# Patient Record
Sex: FEMALE | Race: WHITE
[De-identification: names, ages, dates, MRNs, and addresses within clinical notes are randomized per-mention and may not be internally consistent; named-entity substitution may affect disease eponyms.]

---

## 2019-10-04 ENCOUNTER — HOSPITAL ENCOUNTER (INPATIENT)
Dept: HOSPITAL 25 - MCHOBOUT | Age: 39
LOS: 4 days | Discharge: HOME | End: 2019-10-08
Attending: OBSTETRICS & GYNECOLOGY | Admitting: OBSTETRICS & GYNECOLOGY
Payer: COMMERCIAL

## 2019-10-04 DIAGNOSIS — D64.9: ICD-10-CM

## 2019-10-04 DIAGNOSIS — O34.10: ICD-10-CM

## 2019-10-04 DIAGNOSIS — Z3A.39: ICD-10-CM

## 2019-10-04 DIAGNOSIS — F41.8: ICD-10-CM

## 2019-10-04 LAB
HCT VFR BLD AUTO: 31 % (ref 35–47)
HGB BLD-MCNC: 10.5 G/DL (ref 12–16)
MCH RBC QN AUTO: 29 PG (ref 27–31)
MCHC RBC AUTO-ENTMCNC: 34 G/DL (ref 31–36)
MCV RBC AUTO: 85 FL (ref 80–97)
PLATELET # BLD AUTO: 179 10^3/UL (ref 150–450)
RBC # BLD AUTO: 3.63 10^6 /UL (ref 3.7–4.87)
WBC # BLD AUTO: 8.7 10^3/UL (ref 3.5–10.8)

## 2019-10-04 PROCEDURE — S0191 MISOPROSTOL, ORAL, 200 MCG: HCPCS

## 2019-10-04 PROCEDURE — 86901 BLOOD TYPING SEROLOGIC RH(D): CPT

## 2019-10-04 PROCEDURE — 84112 EVAL AMNIOTIC FLUID PROTEIN: CPT

## 2019-10-04 PROCEDURE — 86900 BLOOD TYPING SEROLOGIC ABO: CPT

## 2019-10-04 PROCEDURE — 80307 DRUG TEST PRSMV CHEM ANLYZR: CPT

## 2019-10-04 PROCEDURE — 85025 COMPLETE CBC W/AUTO DIFF WBC: CPT

## 2019-10-04 PROCEDURE — 85027 COMPLETE CBC AUTOMATED: CPT

## 2019-10-04 PROCEDURE — 86850 RBC ANTIBODY SCREEN: CPT

## 2019-10-04 PROCEDURE — 36415 COLL VENOUS BLD VENIPUNCTURE: CPT

## 2019-10-04 NOTE — HP
General Information





- Reason for Visit


Pt presents with SROM at about , noted to have some yellow/green color.


Having some mild irreg ctx since leaking started.  No strong ctx.


Pregnancy only complicated by AMA and a large uterine fibroid (7-8cm, but not 

obstructing pelvis).





- General Information


Maternal Age: 39


Grav: 1


Para: 0


SAB: 0


IEA: 0





Estimated Due Date: 10/10/19


Determined By: LMP


Gestational Age in Weeks/Days: 39+1


Maternal Blood Type and Rh: O Positive





- Results this Pregnancy


Serology/RPR Result: Non-Reactive


Rubella Result: Immune


HBsAg Result: Negative


HIV Result: Negative


GBS Culture Result: Positive





Past Medical History


Delivery History: See  Records - no deliveries


Pertinent Past Medical History: See  Records - anxiety, migraines, 

asthma


Pertinent Past Surgical History: See  Records - none





- Antepartal Records


Antepartal Records: Reviewed, Pregnancy Complicated by: - above





Review of Systems


Constitutional: Comfortable


CV Complaint: No


Respiratory: Shortness of Breath: No


Gastrointestinal: No Nausea/Vomiting, Normal Bowel Movement


Genitourinary: Leaking Fluid, No Dysuria, No Bleeding


Musculoskeletal: Contractions - very mild


Neurological: No Headache


Fetal Movement: Normal





Exam


Allergies/Adverse Reactions: 


Allergies





No Known Allergies Allergy (Verified 16 09:54)


 








normal, afebrile


Lab Values - Entire Visit: 


 Laboratory Tests











  10/04/19 10/04/19 10/04/19





  20:30 20:30 20:30


 


WBC  8.7  


 


RBC  3.63 L  


 


Hgb  10.5 L  


 


Hct  31 L  


 


MCV  85  


 


MCH  29  


 


MCHC  34  


 


RDW  14  


 


Plt Count  179  


 


MPV  10.5 H  


 


Vag Amniotic Fld Detect    Positive


 


Urine Opiates Screen   None detected 


 


Ur Barbiturates Screen   None detected 


 


Ur Phencyclidine Scrn   None detected 


 


Ur Amphetamines Screen   None detected 


 


U Benzodiazepines Scrn   None detected 


 


Urine Cocaine Screen   None detected 


 


U Cannabinoids Screen   None detected 


 


Blood Type   


 


Antibody Screen   














  10/04/19





  20:30


 


WBC 


 


RBC 


 


Hgb 


 


Hct 


 


MCV 


 


MCH 


 


MCHC 


 


RDW 


 


Plt Count 


 


MPV 


 


Vag Amniotic Fld Detect 


 


Urine Opiates Screen 


 


Ur Barbiturates Screen 


 


Ur Phencyclidine Scrn 


 


Ur Amphetamines Screen 


 


U Benzodiazepines Scrn 


 


Urine Cocaine Screen 


 


U Cannabinoids Screen 


 


Blood Type  A Positive


 


Antibody Screen  Negative














- Measurements


Height: 5 ft 4 in


Weight: 168 lb


Body Mass Index (BMI): 28.8


Pre-Pregnancy Weight: 145 lb





- Exam


Breast: Breast Exam Deferred


CVA: No CVA Tenderness


Heart: Normal Rhythm/Heart Sounds


HEENT: No Significant Findings


Lungs: Clear Bilaterally


Rectal: Rectal Exam Deferred





- Abdominal Exam


Abdomen Exam: Non-Tender, Fundal Height Consistent with Dates





- Ultrasound/Biophysical Profile


Ultrasound Status: Bedside Exam - vertex presentation





Targeted Exam Findings


Estimated Fetal Weight: 7.5 lbs


Cervical Exam: Fingertip


Effacement: Thin


Station: -2


Presenting Part: Vertex


Membrane Status: SROM


Amniotic Fluid Evaluation: Gross Rupture, Meconium





EFM Findings





- External Fetal Monitor Findings


Baseline Fetal Heart Rate: 140


External Fetal Monitor Findings: Accelerations Present, No Pattern of Variable 

or Late Decelerations, Variability Moderate, Baseline Stable


Contractions: Irregular, Mild





Assessment/Plan





- Assessment


40yo G1 at 39 wks with gross SROM, some meconium noted.


Not in labor at this point.  Discussed need to start abx for GBS.


Also discussed mgmt options.  Pt has discussed option of elective C/S a few 

times during her pregnancy.  We reviewed again today since she had more 

questions.  Pt aware there is no medical indication for a C/S at this point 

since baby looks very good and is in cephalic presentation.  However, if she 

desires a  section, she can ask for one.  She understands there are 

risks of surgery as well, and she declines at this point.


Pt desires to proceed with cervical ripening.


Will start with oral misoprostol now.


Plan to observe closely.  Pt desires Nubain/phenergan for sedation if needed.





- Obstetrical Risk Factors


Obstetrical Risk Factors: GBS Positive





- Plan


Plan: Cervical Ripening, Antibiotic Prophylaxis, Admit - Anticipate Vaginal 

Delivery





- Date/Time of Admission


Date of Admission: 10/04/19


Time of Admission: 20:30

## 2019-10-05 LAB
BASOPHILS # BLD AUTO: 0.1 10^3/UL (ref 0–0.2)
EOSINOPHIL # BLD AUTO: 0.1 10^3/UL (ref 0–0.6)
LYMPHOCYTES # BLD AUTO: 1.7 10^3/UL (ref 1–4.8)
MONOCYTES # BLD AUTO: 0.6 10^3/UL (ref 0–0.8)
NEUTROPHILS # BLD AUTO: 6.5 10^3/UL (ref 1.5–7.7)
NRBC # BLD AUTO: 0 10^3/UL
NRBC BLD QL AUTO: 0

## 2019-10-05 PROCEDURE — 0KQM0ZZ REPAIR PERINEUM MUSCLE, OPEN APPROACH: ICD-10-PCS | Performed by: OBSTETRICS & GYNECOLOGY

## 2019-10-05 RX ADMIN — PENICILLIN G POTASSIUM SCH MLS/HR: 20000000 POWDER, FOR SOLUTION INTRAVENOUS at 09:45

## 2019-10-05 RX ADMIN — PENICILLIN G POTASSIUM SCH MLS/HR: 20000000 POWDER, FOR SOLUTION INTRAVENOUS at 05:30

## 2019-10-05 RX ADMIN — ACETAMINOPHEN PRN MG: 325 TABLET ORAL at 22:07

## 2019-10-05 RX ADMIN — PENICILLIN G POTASSIUM SCH MLS/HR: 20000000 POWDER, FOR SOLUTION INTRAVENOUS at 17:50

## 2019-10-05 RX ADMIN — PENICILLIN G POTASSIUM SCH MLS/HR: 20000000 POWDER, FOR SOLUTION INTRAVENOUS at 01:29

## 2019-10-05 RX ADMIN — IBUPROFEN PRN MG: 600 TABLET, FILM COATED ORAL at 22:07

## 2019-10-05 RX ADMIN — PENICILLIN G POTASSIUM SCH MLS/HR: 20000000 POWDER, FOR SOLUTION INTRAVENOUS at 13:31

## 2019-10-05 NOTE — PROCNOTE
[facility] OB: Delivery Note





- Delivery


  ** A


Date of Birth: [birth year]


Time of Birth: 19:34


Gestational Age in Weeks and Days at Delivery: 39 Weeks and 2 Days


Delivery Method: Spontaneous Vaginal


Labor: Spontaneous


Did Patient attempt ?: N/A, No Previous 


Amniotic Fluid: Clear


Estimated Blood Loss: 300


Anesthesia/Analgesia: CEI for Labor


Delivered By: Janet Farr





- Nursery


Level of Nursery: Regular/Bedside





- Perineum


Perineal Injury: 2nd Degree





- Events


Delivery Events of Note: Pitocin Only After Delivery, Full Course of Antibiotics

, Pushed > 3 Hours





- Additional Delivery Notes


Additional Delivery Notes: 





Pt went into early labor after ROM, received an epidural @2cm and progressed to 

active labor and to fully dilated.  She then pushed x4.5hrs to deliver the 

infant's head in YELITZA position followed quickly by the shoulders and the rest of 

the body. The baby was placed on mom's abdomen and the cord clamped x2 and cut.

  Then baby was  taken to the warmer.  The placenta delivered with fundal 

massage and gentle cord traction and appeared intact.  A second degree 

laceration was repaired in the usual fashion. Fundus was firm with good 

hemostasis.  Mom and baby stable at time of note. Problem: Patient Care Overview  Goal: Plan of Care Review  Outcome: Ongoing (interventions implemented as appropriate)  Patient free of falls or injury during shift.  Positions self and ambulates independently.  Pain mildly controlled with prn medication.  Normal sinus rhythm on telemetry.  Safety maintained.  Bed low and locked, side rails x 2, call bell in reach.  Patient resting comfortably in bed; no other complaints at this time.  Will continue to monitor.

## 2019-10-06 LAB
BASOPHILS # BLD AUTO: 0.1 10^3/UL (ref 0–0.2)
EOSINOPHIL # BLD AUTO: 0.1 10^3/UL (ref 0–0.6)
HCT VFR BLD AUTO: 26 % (ref 35–47)
HGB BLD-MCNC: 8.7 G/DL (ref 12–16)
LYMPHOCYTES # BLD AUTO: 1.7 10^3/UL (ref 1–4.8)
MCH RBC QN AUTO: 29 PG (ref 27–31)
MCHC RBC AUTO-ENTMCNC: 34 G/DL (ref 31–36)
MCV RBC AUTO: 85 FL (ref 80–97)
MONOCYTES # BLD AUTO: 0.7 10^3/UL (ref 0–0.8)
NEUTROPHILS # BLD AUTO: 13.4 10^3/UL (ref 1.5–7.7)
NRBC # BLD AUTO: 0 10^3/UL
NRBC BLD QL AUTO: 0
PLATELET # BLD AUTO: 172 10^3/UL (ref 150–450)
RBC # BLD AUTO: 3.03 10^6 /UL (ref 3.7–4.87)
WBC # BLD AUTO: 16 10^3/UL (ref 3.5–10.8)

## 2019-10-06 RX ADMIN — ACETAMINOPHEN PRN MG: 325 TABLET ORAL at 09:48

## 2019-10-06 RX ADMIN — Medication SCH MG: at 09:49

## 2019-10-06 RX ADMIN — DOCUSATE SODIUM SCH MG: 100 CAPSULE, LIQUID FILLED ORAL at 13:26

## 2019-10-06 RX ADMIN — IBUPROFEN PRN MG: 600 TABLET, FILM COATED ORAL at 12:05

## 2019-10-06 RX ADMIN — IBUPROFEN PRN MG: 600 TABLET, FILM COATED ORAL at 04:41

## 2019-10-06 RX ADMIN — Medication SCH MG: at 22:26

## 2019-10-06 RX ADMIN — ACETAMINOPHEN PRN MG: 325 TABLET ORAL at 04:41

## 2019-10-06 RX ADMIN — IBUPROFEN PRN MG: 600 TABLET, FILM COATED ORAL at 18:50

## 2019-10-06 RX ADMIN — DOCUSATE SODIUM SCH MG: 100 CAPSULE, LIQUID FILLED ORAL at 09:49

## 2019-10-06 RX ADMIN — ACETAMINOPHEN PRN MG: 325 TABLET ORAL at 18:50

## 2019-10-06 RX ADMIN — ACETAMINOPHEN PRN MG: 325 TABLET ORAL at 13:26

## 2019-10-06 RX ADMIN — DOCUSATE SODIUM SCH MG: 100 CAPSULE, LIQUID FILLED ORAL at 22:27

## 2019-10-07 RX ADMIN — ACETAMINOPHEN PRN MG: 325 TABLET ORAL at 04:52

## 2019-10-07 RX ADMIN — IBUPROFEN PRN MG: 600 TABLET, FILM COATED ORAL at 12:30

## 2019-10-07 RX ADMIN — IBUPROFEN PRN MG: 600 TABLET, FILM COATED ORAL at 04:52

## 2019-10-07 RX ADMIN — DOCUSATE SODIUM SCH MG: 100 CAPSULE, LIQUID FILLED ORAL at 14:06

## 2019-10-07 RX ADMIN — IBUPROFEN PRN MG: 600 TABLET, FILM COATED ORAL at 18:35

## 2019-10-07 RX ADMIN — WITCH HAZEL PRN PAD: 5 CLOTH TOPICAL at 04:52

## 2019-10-07 RX ADMIN — DOCUSATE SODIUM SCH MG: 100 CAPSULE, LIQUID FILLED ORAL at 08:45

## 2019-10-07 RX ADMIN — ACETAMINOPHEN PRN MG: 325 TABLET ORAL at 14:06

## 2019-10-07 RX ADMIN — DIBUCAINE PRN APPLIC: 1 OINTMENT TOPICAL at 04:52

## 2019-10-07 RX ADMIN — Medication SCH MG: at 21:46

## 2019-10-07 RX ADMIN — ACETAMINOPHEN PRN MG: 325 TABLET ORAL at 08:45

## 2019-10-07 RX ADMIN — DOCUSATE SODIUM SCH MG: 100 CAPSULE, LIQUID FILLED ORAL at 21:46

## 2019-10-07 RX ADMIN — ACETAMINOPHEN PRN MG: 325 TABLET ORAL at 18:35

## 2019-10-07 RX ADMIN — Medication SCH MG: at 08:45

## 2019-10-08 VITALS — SYSTOLIC BLOOD PRESSURE: 108 MMHG | DIASTOLIC BLOOD PRESSURE: 66 MMHG

## 2019-10-08 RX ADMIN — IBUPROFEN PRN MG: 600 TABLET, FILM COATED ORAL at 00:22

## 2019-10-08 RX ADMIN — ACETAMINOPHEN PRN MG: 325 TABLET ORAL at 00:22

## 2019-10-08 RX ADMIN — DIBUCAINE PRN APPLIC: 1 OINTMENT TOPICAL at 12:28

## 2019-10-08 RX ADMIN — ACETAMINOPHEN PRN MG: 325 TABLET ORAL at 05:09

## 2019-10-08 RX ADMIN — DOCUSATE SODIUM SCH MG: 100 CAPSULE, LIQUID FILLED ORAL at 07:54

## 2019-10-08 RX ADMIN — WITCH HAZEL PRN PAD: 5 CLOTH TOPICAL at 12:28

## 2019-10-08 RX ADMIN — IBUPROFEN PRN MG: 600 TABLET, FILM COATED ORAL at 07:53

## 2019-10-08 RX ADMIN — Medication SCH MG: at 07:54
